# Patient Record
Sex: FEMALE | Race: WHITE
[De-identification: names, ages, dates, MRNs, and addresses within clinical notes are randomized per-mention and may not be internally consistent; named-entity substitution may affect disease eponyms.]

---

## 2020-12-12 ENCOUNTER — HOSPITAL ENCOUNTER (EMERGENCY)
Dept: HOSPITAL 92 - ERS | Age: 30
Discharge: HOME | End: 2020-12-12
Payer: COMMERCIAL

## 2020-12-12 DIAGNOSIS — R07.89: Primary | ICD-10-CM

## 2020-12-12 PROCEDURE — 93005 ELECTROCARDIOGRAM TRACING: CPT

## 2020-12-12 PROCEDURE — 71045 X-RAY EXAM CHEST 1 VIEW: CPT

## 2020-12-12 NOTE — RAD
Chest one view



HISTORY: Chest pain.



COMPARISON: 11/10/2010.



FINDINGS: Cardiac silhouette and pulmonary vasculature are unremarkable. Mediastinum is midline.



No confluent airspace consolidation or evidence of pneumothorax.



  



IMPRESSION :

No active cardiopulmonary abnormalities are demonstrated.



Reported By: ARLENE Patel 

Electronically Signed:  12/12/2020 9:18 PM